# Patient Record
Sex: FEMALE | Race: WHITE | NOT HISPANIC OR LATINO | URBAN - METROPOLITAN AREA
[De-identification: names, ages, dates, MRNs, and addresses within clinical notes are randomized per-mention and may not be internally consistent; named-entity substitution may affect disease eponyms.]

---

## 2020-07-25 ENCOUNTER — TELEPHONE ENCOUNTER (OUTPATIENT)
Dept: URBAN - METROPOLITAN AREA CLINIC 13 | Facility: CLINIC | Age: 75
End: 2020-07-25

## 2020-07-25 RX ORDER — PANTOPRAZOLE SODIUM 40 MG/1
TAKE 1 TABLET DAILY TABLET, DELAYED RELEASE ORAL
Qty: 5 | Refills: 2 | OUTPATIENT
Start: 2018-07-24 | End: 2019-01-30

## 2020-07-25 RX ORDER — MULTIVITAMIN
TAKE 1 CAPSULE DAILY CAPSULE ORAL
Refills: 0 | OUTPATIENT
Start: 2014-10-20 | End: 2018-07-24

## 2020-07-25 RX ORDER — ESZOPICLONE 2 MG
TAKE 1 TABLET AT BEDTIME AS NEEDED TABLET ORAL
Refills: 0 | OUTPATIENT
Start: 2014-10-20 | End: 2018-07-24

## 2020-07-25 RX ORDER — RED YEAST RICE 600 MG
TAKE AS DIRECTED CAPSULE ORAL
Refills: 0 | OUTPATIENT
Start: 2014-10-20 | End: 2018-07-24

## 2020-07-25 RX ORDER — POLYETHYLENE GLYCOL 3350, SODIUM SULFATE, SODIUM CHLORIDE, POTASSIUM CHLORIDE, ASCORBIC ACID, SODIUM ASCORBATE 7.5-2.691G
DRINK 1 LITER OF SOLUTION AT 5:00 PM THE DAY BEFORE PROCEDURE; DRINK 1 LITER OF SOLUTION 6 HOURS PRIOR TO PROCEDURE KIT ORAL
Qty: 2000 | Refills: 0 | OUTPATIENT
Start: 2018-12-07 | End: 2018-12-12

## 2020-07-25 RX ORDER — NITROGLYCERIN 40 MG/1
APPLY PATCH FOR 12 TO 14 HOURS DAILY, THEN REMOVE PATCH TRANSDERMAL
Refills: 0 | OUTPATIENT
Start: 2014-10-20 | End: 2018-07-24

## 2020-07-25 RX ORDER — POLYETHYLENE GLYCOL 3350, SODIUM SULFATE, SODIUM CHLORIDE, POTASSIUM CHLORIDE, ASCORBIC ACID, SODIUM ASCORBATE 7.5-2.691G
USE AS DIRECTED KIT ORAL
Qty: 1 | Refills: 0 | OUTPATIENT
Start: 2014-10-20 | End: 2018-07-24

## 2020-07-25 RX ORDER — LIRAGLUTIDE 6 MG/ML
INJECT 1.8 MG SUBCUTANEOUSLY EVERY DAY INJECTION SUBCUTANEOUS
Refills: 0 | OUTPATIENT
Start: 2014-10-20 | End: 2018-12-07

## 2020-07-25 RX ORDER — MULTIVITAMIN,THER AND MINERALS
TAKE 1 TABLET DAILY TABLET ORAL
Refills: 0 | OUTPATIENT
Start: 2005-12-09 | End: 2018-07-24

## 2020-07-25 RX ORDER — SODIUM SULFATE, POTASSIUM SULFATE, MAGNESIUM SULFATE 17.5; 3.13; 1.6 G/ML; G/ML; G/ML
5PM THE DAY BEFORE PROCEDURE, DRINK 1/2 OF PREP, THEN 6HOURS BEFORE PROCEDURE DRINK REMAINDER OF PREP SOLUTION, CONCENTRATE ORAL
Qty: 1 | Refills: 0 | OUTPATIENT
Start: 2018-12-10 | End: 2019-01-30

## 2020-07-25 RX ORDER — POLYETHYLENE GLYCOL 3350, SODIUM CHLORIDE, SODIUM BICARBONATE AND POTASSIUM CHLORIDE WITH LEMON FLAVOR 420; 11.2; 5.72; 1.48 G/4L; G/4L; G/4L; G/4L
TAKE 1/2 GALLON AT 5:00 PM DAY BEFORE PROCEDURE, TAKE SECOND 1/2 OF GALLON 6 HRS PRIOR TO PROCEDURE POWDER, FOR SOLUTION ORAL
Qty: 1 | Refills: 0 | OUTPATIENT
Start: 2018-12-21 | End: 2019-01-30

## 2020-07-26 ENCOUNTER — TELEPHONE ENCOUNTER (OUTPATIENT)
Dept: URBAN - METROPOLITAN AREA CLINIC 13 | Facility: CLINIC | Age: 75
End: 2020-07-26

## 2020-07-26 RX ORDER — CLINDAMYCIN HYDROCHLORIDE 300 MG/1
TAKE 4 CAPSULES BY MOUTH 1 HOUR PRIOR TO DENTAL APPOINTMENT CAPSULE ORAL
Qty: 4 | Refills: 0 | Status: ACTIVE | COMMUNITY
Start: 2018-03-26

## 2020-07-26 RX ORDER — CLINDAMYCIN HYDROCHLORIDE 300 MG/1
CAPSULE ORAL
Qty: 21 | Refills: 0 | Status: ACTIVE | COMMUNITY
Start: 2014-11-28

## 2020-07-26 RX ORDER — CYCLOBENZAPRINE HYDROCHLORIDE 10 MG/1
TAKE 1 TABLET BY MOUTH THREE TIMES A DAY IF NEEDED TABLET, FILM COATED ORAL
Qty: 90 | Refills: 0 | Status: ACTIVE | COMMUNITY
Start: 2018-01-29

## 2020-07-26 RX ORDER — AZITHROMYCIN DIHYDRATE 250 MG/1
TABLET, FILM COATED ORAL
Qty: 12 | Refills: 0 | Status: ACTIVE | COMMUNITY
Start: 2014-05-01

## 2020-07-26 RX ORDER — MULTIVITAMIN
TAKE 1 CAPSULE DAILY CAPSULE ORAL
Refills: 0 | Status: ACTIVE | COMMUNITY

## 2020-07-26 RX ORDER — OMEPRAZOLE 40 MG/1
CAPSULE, DELAYED RELEASE ORAL
Qty: 30 | Refills: 0 | Status: ACTIVE | COMMUNITY
Start: 2018-07-05

## 2020-07-26 RX ORDER — MUPIROCIN 20 MG/G
OINTMENT TOPICAL
Qty: 22 | Refills: 0 | Status: ACTIVE | COMMUNITY
Start: 2014-11-28

## 2020-07-26 RX ORDER — ALPRAZOLAM 0.5 MG/1
TABLET ORAL
Qty: 2 | Refills: 0 | Status: ACTIVE | COMMUNITY
Start: 2018-01-05

## 2020-07-26 RX ORDER — OMEGA-3-ACID ETHYL ESTERS 1 G/1
CAPSULE, LIQUID FILLED ORAL
Qty: 255 | Refills: 0 | Status: ACTIVE | COMMUNITY
Start: 2013-12-03

## 2020-07-26 RX ORDER — IBUPROFEN 800 MG
USE AS DIRECTED. PT STATES UNKNOWN DOSAGE TABLET ORAL
Refills: 0 | Status: ACTIVE | COMMUNITY

## 2020-07-26 RX ORDER — ALPRAZOLAM 0.5 MG/1
TABLET ORAL
Qty: 2 | Refills: 0 | Status: ACTIVE | COMMUNITY
Start: 2019-09-30

## 2020-07-26 RX ORDER — OMEGA-3-ACID ETHYL ESTERS 1 G/1
CAPSULE, LIQUID FILLED ORAL
Qty: 255 | Refills: 0 | Status: ACTIVE | COMMUNITY
Start: 2013-12-04

## 2020-07-26 RX ORDER — EXENATIDE 2 MG/.65ML
INJECTION, SUSPENSION, EXTENDED RELEASE SUBCUTANEOUS
Qty: 4 | Refills: 0 | Status: ACTIVE | COMMUNITY
Start: 2015-03-17

## 2020-07-26 RX ORDER — EXENATIDE 2 MG/.65ML
INJECTION, SUSPENSION, EXTENDED RELEASE SUBCUTANEOUS
Qty: 12 | Refills: 0 | Status: ACTIVE | COMMUNITY
Start: 2018-09-04

## 2020-07-26 RX ORDER — DEXAMETHASONE 1 MG
TABLET ORAL
Qty: 1 | Refills: 0 | Status: ACTIVE | COMMUNITY
Start: 2019-04-30

## 2020-07-26 RX ORDER — IBUPROFEN 800 MG/1
TAKE 1 TABLET BY MOUTH THREE TIMES A DAY TABLET ORAL
Qty: 30 | Refills: 0 | Status: ACTIVE | COMMUNITY
Start: 2019-11-06

## 2020-07-26 RX ORDER — ONDANSETRON 8 MG/1
TABLET ORAL
Qty: 30 | Refills: 0 | Status: ACTIVE | COMMUNITY
Start: 2018-07-05

## 2020-07-26 RX ORDER — MELOXICAM 7.5 MG/1
TABLET ORAL
Qty: 90 | Refills: 0 | Status: ACTIVE | COMMUNITY
Start: 2019-07-24

## 2020-07-26 RX ORDER — FOLIC ACID 0.8 MG
TAKE AS DIRECTED TABLET ORAL
Refills: 0 | Status: ACTIVE | COMMUNITY

## 2020-07-26 RX ORDER — TEMAZEPAM 30 MG/1
CAPSULE ORAL
Qty: 90 | Refills: 0 | Status: ACTIVE | COMMUNITY
Start: 2019-09-25

## 2020-07-26 RX ORDER — SITAGLIPTIN 50 MG/1
TABLET, FILM COATED ORAL
Qty: 30 | Refills: 0 | Status: ACTIVE | COMMUNITY
Start: 2019-05-22

## 2020-07-26 RX ORDER — CLINDAMYCIN HYDROCHLORIDE 300 MG/1
CAPSULE ORAL
Qty: 6 | Refills: 0 | Status: ACTIVE | COMMUNITY
Start: 2018-11-16

## 2020-07-26 RX ORDER — TEMAZEPAM 30 MG/1
CAPSULE ORAL
Qty: 30 | Refills: 0 | Status: ACTIVE | COMMUNITY
Start: 2018-12-21

## 2020-07-26 RX ORDER — MUPIROCIN 20 MG/G
OINTMENT TOPICAL
Qty: 22 | Refills: 0 | Status: ACTIVE | COMMUNITY
Start: 2018-02-20

## 2020-07-26 RX ORDER — GLIPIZIDE 10 MG/1
TAKE 1 TABLET TWICE DAILY TABLET, EXTENDED RELEASE ORAL
Refills: 0 | Status: ACTIVE | COMMUNITY

## 2020-07-26 RX ORDER — TEMAZEPAM 30 MG/1
CAPSULE ORAL
Qty: 30 | Refills: 0 | Status: ACTIVE | COMMUNITY
Start: 2019-01-11

## 2020-07-26 RX ORDER — NITROGLYCERIN 0.4 MG/1
TABLET SUBLINGUAL
Qty: 25 | Refills: 0 | Status: ACTIVE | COMMUNITY
Start: 2018-04-10

## 2020-07-26 RX ORDER — TEMAZEPAM 15 MG/1
TAKE 2 CAPSULE BY MOUTH AT BEDTIME FOR SLEEP CAPSULE ORAL
Qty: 12 | Refills: 0 | Status: ACTIVE | COMMUNITY
Start: 2019-02-08

## 2020-07-26 RX ORDER — DOXYCYCLINE HYCLATE 100 MG/1
TABLET ORAL
Qty: 28 | Refills: 0 | Status: ACTIVE | COMMUNITY
Start: 2014-08-31

## 2022-03-22 ENCOUNTER — OFFICE VISIT (OUTPATIENT)
Dept: URBAN - METROPOLITAN AREA CLINIC 72 | Facility: CLINIC | Age: 77
End: 2022-03-22
Payer: MEDICARE

## 2022-03-22 VITALS
SYSTOLIC BLOOD PRESSURE: 143 MMHG | HEART RATE: 70 BPM | BODY MASS INDEX: 29.8 KG/M2 | WEIGHT: 151.8 LBS | TEMPERATURE: 98.1 F | DIASTOLIC BLOOD PRESSURE: 79 MMHG | RESPIRATION RATE: 16 BRPM | HEIGHT: 60 IN

## 2022-03-22 DIAGNOSIS — K57.90 DIVERTICULOSIS: ICD-10-CM

## 2022-03-22 DIAGNOSIS — R10.11 RUQ ABDOMINAL PAIN: ICD-10-CM

## 2022-03-22 PROCEDURE — 99214 OFFICE O/P EST MOD 30 MIN: CPT | Performed by: INTERNAL MEDICINE

## 2022-03-22 RX ORDER — OXYBUTYNIN CHLORIDE 5 MG/1
1 TABLET TABLET, EXTENDED RELEASE ORAL ONCE A DAY
Status: ACTIVE | COMMUNITY

## 2022-03-22 RX ORDER — IBUPROFEN 800 MG/1
TAKE 1 TABLET BY MOUTH THREE TIMES A DAY TABLET ORAL
Qty: 30 | Refills: 0 | Status: DISCONTINUED | COMMUNITY
Start: 2019-11-06

## 2022-03-22 RX ORDER — DEXAMETHASONE 1 MG
TABLET ORAL
Qty: 1 | Refills: 0 | Status: DISCONTINUED | COMMUNITY
Start: 2019-04-30

## 2022-03-22 RX ORDER — EMPAGLIFLOZIN 10 MG/1
1 TABLET TABLET, FILM COATED ORAL ONCE A DAY
Status: ACTIVE | COMMUNITY

## 2022-03-22 RX ORDER — CLINDAMYCIN HYDROCHLORIDE 300 MG/1
CAPSULE ORAL
Qty: 21 | Refills: 0 | Status: DISCONTINUED | COMMUNITY
Start: 2014-11-28

## 2022-03-22 RX ORDER — MULTIVITAMIN
TAKE 2 CAPSULE DAILY CAPSULE ORAL ONCE DAILY
Refills: 0 | Status: ACTIVE | COMMUNITY

## 2022-03-22 RX ORDER — OMEGA-3-ACID ETHYL ESTERS 1 G/1
CAPSULE, LIQUID FILLED ORAL
Qty: 255 | Refills: 0 | Status: DISCONTINUED | COMMUNITY
Start: 2013-12-03

## 2022-03-22 RX ORDER — GLIPIZIDE 10 MG/1
TAKE 1 TABLET TWICE DAILY TABLET, EXTENDED RELEASE ORAL
Refills: 0 | Status: DISCONTINUED | COMMUNITY

## 2022-03-22 RX ORDER — ALPRAZOLAM 0.5 MG/1
TABLET ORAL
Qty: 2 | Refills: 0 | Status: DISCONTINUED | COMMUNITY
Start: 2018-01-05

## 2022-03-22 RX ORDER — ONDANSETRON 8 MG/1
TABLET ORAL
Qty: 30 | Refills: 0 | Status: DISCONTINUED | COMMUNITY
Start: 2018-07-05

## 2022-03-22 RX ORDER — THYROID, PORCINE 30 MG/1
1 TABLET ON AN EMPTY STOMACH TABLET ORAL ONCE A DAY
Status: ACTIVE | COMMUNITY

## 2022-03-22 RX ORDER — OMEGA-3-ACID ETHYL ESTERS 1 G/1
CAPSULE, LIQUID FILLED ORAL
Qty: 255 | Refills: 0 | Status: DISCONTINUED | COMMUNITY
Start: 2013-12-04

## 2022-03-22 RX ORDER — AZITHROMYCIN DIHYDRATE 250 MG/1
TABLET, FILM COATED ORAL
Qty: 12 | Refills: 0 | Status: DISCONTINUED | COMMUNITY
Start: 2014-05-01

## 2022-03-22 RX ORDER — SITAGLIPTIN 50 MG/1
1 TABLET TABLET, FILM COATED ORAL ONCE A DAY
Refills: 0 | Status: ACTIVE | COMMUNITY
Start: 2019-05-22

## 2022-03-22 RX ORDER — ASPIRIN 81 MG/1
1 TABLET TABLET, COATED ORAL
Status: ACTIVE | COMMUNITY

## 2022-03-22 RX ORDER — MUPIROCIN 20 MG/G
OINTMENT TOPICAL
Qty: 22 | Refills: 0 | Status: DISCONTINUED | COMMUNITY
Start: 2014-11-28

## 2022-03-22 RX ORDER — FOLIC ACID 0.8 MG
2 CAPSULES TABLET ORAL ONCE A DAY
Refills: 0 | Status: ACTIVE | COMMUNITY

## 2022-03-22 RX ORDER — EVOLOCUMAB 140 MG/ML
1 ML INJECTION, SOLUTION SUBCUTANEOUS
Status: ACTIVE | COMMUNITY

## 2022-03-22 RX ORDER — DOXYCYCLINE HYCLATE 100 MG/1
TABLET ORAL
Qty: 28 | Refills: 0 | Status: DISCONTINUED | COMMUNITY
Start: 2014-08-31

## 2022-03-22 RX ORDER — TEMAZEPAM 15 MG/1
1 CAPSULE AT BEDTIME AS NEEDED CAPSULE ORAL ONCE A DAY
Refills: 0 | Status: ACTIVE | COMMUNITY
Start: 2019-02-08

## 2022-03-22 RX ORDER — NITROGLYCERIN 0.4 MG/1
TABLET SUBLINGUAL
Qty: 25 | Refills: 0 | Status: DISCONTINUED | COMMUNITY
Start: 2018-04-10

## 2022-03-22 RX ORDER — CYCLOBENZAPRINE HYDROCHLORIDE 10 MG/1
TAKE 1 TABLET BY MOUTH THREE TIMES A DAY IF NEEDED TABLET, FILM COATED ORAL
Qty: 90 | Refills: 0 | Status: DISCONTINUED | COMMUNITY
Start: 2018-01-29

## 2022-03-22 RX ORDER — EXENATIDE 2 MG/.65ML
INJECTION, SUSPENSION, EXTENDED RELEASE SUBCUTANEOUS
Qty: 4 | Refills: 0 | Status: DISCONTINUED | COMMUNITY
Start: 2015-03-17

## 2022-03-22 RX ORDER — MELOXICAM 7.5 MG/1
TABLET ORAL
Qty: 90 | Refills: 0 | Status: DISCONTINUED | COMMUNITY
Start: 2019-07-24

## 2022-03-22 RX ORDER — TEMAZEPAM 30 MG/1
CAPSULE ORAL
Qty: 30 | Refills: 0 | Status: DISCONTINUED | COMMUNITY
Start: 2018-12-21

## 2022-03-22 RX ORDER — OMEPRAZOLE 40 MG/1
CAPSULE, DELAYED RELEASE ORAL
Qty: 30 | Refills: 0 | Status: DISCONTINUED | COMMUNITY
Start: 2018-07-05

## 2022-03-22 RX ORDER — IBUPROFEN 800 MG
USE AS DIRECTED. PT STATES UNKNOWN DOSAGE TABLET ORAL
Refills: 0 | Status: DISCONTINUED | COMMUNITY

## 2022-03-22 NOTE — HPI-TODAY'S VISIT:
Ms. Davis returns for follow-up, she is a 76-year-old female last seen in our office on 1/9/2020.  She has history of coronary artery disease with stents, sleep apnea on CPAP type 2 diabetes, diverticulosis with diverticular colitis status post hemicolectomy many years ago.  She had a colonoscopy that had hyperplastic polyps.  This was done in 2020.  Diverticular disease was also noted. She now returns with a complaint today of stomach pain.  She reports that she has right-sided abdominal pain, has been going on for 6 months off and on be getting worse.  It happens after she eats often, it now radiates across her abdomen and around to her back.  She denies any change of her bowel habits with this.  She was afraid that she may have chest pain associated with this, she went to her cardiologist, work-up there was negative.  She does have an implantable loop recorder. She denies any weight loss.  No change in bowel habits, no nausea or vomiting.  She is concerned that the squeezing pressure that she experiences in the right upper quadrant.  She does still have her gallbladder.

## 2022-04-07 ENCOUNTER — LAB OUTSIDE AN ENCOUNTER (OUTPATIENT)
Dept: URBAN - METROPOLITAN AREA CLINIC 72 | Facility: CLINIC | Age: 77
End: 2022-04-07

## 2022-04-07 ENCOUNTER — OFFICE VISIT (OUTPATIENT)
Dept: URBAN - METROPOLITAN AREA CLINIC 72 | Facility: CLINIC | Age: 77
End: 2022-04-07
Payer: MEDICARE

## 2022-04-07 ENCOUNTER — WEB ENCOUNTER (OUTPATIENT)
Dept: URBAN - METROPOLITAN AREA CLINIC 72 | Facility: CLINIC | Age: 77
End: 2022-04-07

## 2022-04-07 VITALS
SYSTOLIC BLOOD PRESSURE: 127 MMHG | WEIGHT: 152.8 LBS | DIASTOLIC BLOOD PRESSURE: 74 MMHG | HEART RATE: 74 BPM | BODY MASS INDEX: 30 KG/M2 | HEIGHT: 60 IN | RESPIRATION RATE: 16 BRPM | TEMPERATURE: 98.1 F

## 2022-04-07 DIAGNOSIS — K57.90 DIVERTICULOSIS: ICD-10-CM

## 2022-04-07 DIAGNOSIS — R10.11 RUQ ABDOMINAL PAIN: ICD-10-CM

## 2022-04-07 PROCEDURE — 99214 OFFICE O/P EST MOD 30 MIN: CPT | Performed by: INTERNAL MEDICINE

## 2022-04-07 RX ORDER — MULTIVITAMIN
TAKE 2 CAPSULE DAILY CAPSULE ORAL ONCE DAILY
Refills: 0 | Status: ACTIVE | COMMUNITY

## 2022-04-07 RX ORDER — THYROID, PORCINE 30 MG/1
1 TABLET ON AN EMPTY STOMACH TABLET ORAL ONCE A DAY
Status: ACTIVE | COMMUNITY

## 2022-04-07 RX ORDER — OXYBUTYNIN CHLORIDE 10 MG/1
1 TABLET TABLET, EXTENDED RELEASE ORAL ONCE A DAY
Status: ACTIVE | COMMUNITY

## 2022-04-07 RX ORDER — EMPAGLIFLOZIN 10 MG/1
1 TABLET TABLET, FILM COATED ORAL ONCE A DAY
Status: ACTIVE | COMMUNITY

## 2022-04-07 RX ORDER — ASPIRIN 81 MG/1
1 TABLET TABLET, COATED ORAL
Status: ACTIVE | COMMUNITY

## 2022-04-07 RX ORDER — EVOLOCUMAB 140 MG/ML
1 ML INJECTION, SOLUTION SUBCUTANEOUS
Status: DISCONTINUED | COMMUNITY

## 2022-04-07 RX ORDER — TEMAZEPAM 30 MG/1
1 CAPSULE AT BEDTIME AS NEEDED CAPSULE ORAL ONCE A DAY
Refills: 0 | Status: ACTIVE | COMMUNITY
Start: 2019-02-08

## 2022-04-07 RX ORDER — EVOLOCUMAB 420 MG/3.5
420MG KIT SUBCUTANEOUS
Status: ACTIVE | COMMUNITY

## 2022-04-07 RX ORDER — SITAGLIPTIN 50 MG/1
1 TABLET TABLET, FILM COATED ORAL ONCE A DAY
Refills: 0 | Status: ACTIVE | COMMUNITY
Start: 2019-05-22

## 2022-04-07 RX ORDER — THIAMINE HCL 100 MG
1 CAPSULE WITH A MEAL TABLET ORAL TWICE DAILY
Refills: 0 | Status: ACTIVE | COMMUNITY

## 2022-04-07 NOTE — HPI-TODAY'S VISIT:
Mrs. Davis returns for follow-up, recall she is a 76-year-old female last seen in office on 3/22/2022.  Past medical history significant for coronary artery disease with stent placement, sleep apnea on CPAP, type 2 diabetes, diverticulosis diverticular colitis status post hemicolectomy many years ago, hyperplastic polyps on colonoscopy 2020 and abdominal pain.  Also recently had work-up with cardiology has an implantable loop recorder.  When she was seen 3/22 she has right-sided abdominal pain for greater than 6 months no weight loss, no change in bowel habits no other alarm symptoms.  We decided to proceed with an ultrasound, this showed fatty infiltration of the liver otherwise unremarkable.  Still with post prandial  upper abdominal pain, intially it was ruq but now is more epigasdtric and generalized. Occurs almost immediatly after eating, associate with nausea no vomiting. Notes a history of an HH. Does have rare intermittent dysphagia. No change in BMs.

## 2022-04-29 ENCOUNTER — OFFICE VISIT (OUTPATIENT)
Dept: URBAN - METROPOLITAN AREA MEDICAL CENTER 40 | Facility: MEDICAL CENTER | Age: 77
End: 2022-04-29

## 2022-05-10 ENCOUNTER — OFFICE VISIT (OUTPATIENT)
Dept: URBAN - METROPOLITAN AREA CLINIC 72 | Facility: CLINIC | Age: 77
End: 2022-05-10

## 2022-06-30 ENCOUNTER — TELEPHONE ENCOUNTER (OUTPATIENT)
Dept: URBAN - METROPOLITAN AREA CLINIC 72 | Facility: CLINIC | Age: 77
End: 2022-06-30

## 2022-08-04 ENCOUNTER — OFFICE VISIT (OUTPATIENT)
Dept: URBAN - METROPOLITAN AREA MEDICAL CENTER 40 | Facility: MEDICAL CENTER | Age: 77
End: 2022-08-04
Payer: MEDICARE

## 2022-08-04 DIAGNOSIS — R10.11 RUQ ABDOMINAL PAIN: ICD-10-CM

## 2022-08-04 DIAGNOSIS — K29.80 DUODENITIS: ICD-10-CM

## 2022-08-04 DIAGNOSIS — K31.89 ACQUIRED DEFORMITY OF DUODENUM: ICD-10-CM

## 2022-08-04 DIAGNOSIS — K44.9 DIAPHRAGMATIC HERNIA: ICD-10-CM

## 2022-08-04 DIAGNOSIS — K22.89 DILATATION OF ESOPHAGUS: ICD-10-CM

## 2022-08-04 PROCEDURE — 43239 EGD BIOPSY SINGLE/MULTIPLE: CPT | Performed by: INTERNAL MEDICINE

## 2022-08-15 PROBLEM — 72007001 DUODENITIS: Status: ACTIVE | Noted: 2022-08-15

## 2022-08-18 ENCOUNTER — TELEPHONE ENCOUNTER (OUTPATIENT)
Dept: URBAN - METROPOLITAN AREA CLINIC 113 | Facility: CLINIC | Age: 77
End: 2022-08-18

## 2022-09-09 ENCOUNTER — OFFICE VISIT (OUTPATIENT)
Dept: URBAN - METROPOLITAN AREA CLINIC 72 | Facility: CLINIC | Age: 77
End: 2022-09-09
Payer: MEDICARE

## 2022-09-09 ENCOUNTER — DASHBOARD ENCOUNTERS (OUTPATIENT)
Age: 77
End: 2022-09-09

## 2022-09-09 VITALS
HEIGHT: 60 IN | BODY MASS INDEX: 28.86 KG/M2 | DIASTOLIC BLOOD PRESSURE: 76 MMHG | SYSTOLIC BLOOD PRESSURE: 123 MMHG | TEMPERATURE: 75 F | WEIGHT: 147 LBS

## 2022-09-09 DIAGNOSIS — K57.90 DIVERTICULOSIS: ICD-10-CM

## 2022-09-09 DIAGNOSIS — R10.84 GENERALIZED ABDOMINAL PAIN: ICD-10-CM

## 2022-09-09 DIAGNOSIS — R10.11 RUQ ABDOMINAL PAIN: ICD-10-CM

## 2022-09-09 PROBLEM — 397881000: Status: ACTIVE | Noted: 2022-03-22

## 2022-09-09 PROBLEM — 301717006 RIGHT UPPER QUADRANT PAIN: Status: ACTIVE | Noted: 2022-06-30

## 2022-09-09 PROCEDURE — 99214 OFFICE O/P EST MOD 30 MIN: CPT | Performed by: INTERNAL MEDICINE

## 2022-09-09 RX ORDER — EVOLOCUMAB 420 MG/3.5
420MG KIT SUBCUTANEOUS
Status: ACTIVE | COMMUNITY

## 2022-09-09 RX ORDER — SITAGLIPTIN 50 MG/1
1 TABLET TABLET, FILM COATED ORAL ONCE A DAY
Refills: 0 | Status: ACTIVE | COMMUNITY
Start: 2019-05-22

## 2022-09-09 RX ORDER — TEMAZEPAM 30 MG/1
1 CAPSULE AT BEDTIME AS NEEDED CAPSULE ORAL ONCE A DAY
Refills: 0 | Status: ACTIVE | COMMUNITY
Start: 2019-02-08

## 2022-09-09 RX ORDER — THYROID, PORCINE 30 MG/1
1 TABLET ON AN EMPTY STOMACH TABLET ORAL ONCE A DAY
Status: ACTIVE | COMMUNITY

## 2022-09-09 RX ORDER — OXYBUTYNIN CHLORIDE 10 MG/1
1 TABLET TABLET, EXTENDED RELEASE ORAL ONCE A DAY
Status: ACTIVE | COMMUNITY

## 2022-09-09 RX ORDER — EMPAGLIFLOZIN 10 MG/1
1 TABLET TABLET, FILM COATED ORAL ONCE A DAY
Status: ACTIVE | COMMUNITY

## 2022-09-09 RX ORDER — ASPIRIN 81 MG/1
1 TABLET TABLET, COATED ORAL
Status: ACTIVE | COMMUNITY

## 2022-09-09 RX ORDER — MULTIVITAMIN
TAKE 2 CAPSULE DAILY CAPSULE ORAL ONCE DAILY
Refills: 0 | Status: ACTIVE | COMMUNITY

## 2022-09-09 RX ORDER — THIAMINE HCL 100 MG
1 CAPSULE WITH A MEAL TABLET ORAL TWICE DAILY
Refills: 0 | Status: ACTIVE | COMMUNITY

## 2022-09-09 NOTE — HPI-TODAY'S VISIT:
Mrs. Davis returns for follow-up, she is a 77-year-old female last seen in our office on 4/7/2022.  She has history of coronary artery disease with stent placement, sleep apnea on CPAP, type 2 diabetes, diverticulosis status post hemicolectomy many years ago, as well as generalized and postprandial abdominal pain.  She had an ultrasound performed we saw her in March that showed some fatty infiltration of the liver.  We manage this conservatively but due to ongoing symptoms decided to proceed with upper endoscopy, this was done on 8/4/2022 significant for 2 cm hiatal hernia sliding in nature as well as mildly severe gastritis.  There is also a D3 well-circumscribed mass suggestive of lipoma however the scope could not be advanced to that area for further biopsy and evaluation.  Biopsies from the upper endoscopy did show peptic duodenitis, stomach and esophageal biopsies were unremarkable.  We arranged for a small bowel follow-through for her nausea and vomiting after eating that she had a small hiatal hernia and moderate to severe esophageal dysmotility.  No obvious mucosal lesions appreciated on that exam.  Past she is having significant abdominal pain she is distended today she has nausea daily history of twice, has lost weight.  She has had history of adhesions in the past.  She is not a great surgical candidate.  She reports that she feels worse nearly every single day.  She has not had any cross-sectional imaging, she is allergic to CT contrast dye.  Her last labs were from April.  They were unremarkable and within normal limits.

## 2022-09-13 LAB
A/G RATIO: 2.1
ALBUMIN: 4.2
ALKALINE PHOSPHATASE: 56
ALT (SGPT): 27
AST (SGOT): 28
BILIRUBIN, TOTAL: 0.5
BUN/CREATININE RATIO: 22
BUN: 13
CALCIUM: 9.1
CARBON DIOXIDE, TOTAL: 23
CHLORIDE: 98
CREATININE: 0.58
EGFR: 94
GLOBULIN, TOTAL: 2
GLUCOSE: 144
POTASSIUM: 4.1
PROTEIN, TOTAL: 6.2
SODIUM: 137

## 2022-09-16 ENCOUNTER — TELEPHONE ENCOUNTER (OUTPATIENT)
Dept: URBAN - METROPOLITAN AREA CLINIC 72 | Facility: CLINIC | Age: 77
End: 2022-09-16

## 2022-12-21 ENCOUNTER — OFFICE VISIT (OUTPATIENT)
Dept: URBAN - METROPOLITAN AREA CLINIC 72 | Facility: CLINIC | Age: 77
End: 2022-12-21

## 2024-11-21 ENCOUNTER — TELEPHONE ENCOUNTER (OUTPATIENT)
Dept: URBAN - METROPOLITAN AREA CLINIC 113 | Facility: CLINIC | Age: 79
End: 2024-11-21

## 2024-11-27 ENCOUNTER — TELEPHONE ENCOUNTER (OUTPATIENT)
Dept: URBAN - METROPOLITAN AREA CLINIC 72 | Facility: CLINIC | Age: 79
End: 2024-11-27

## 2024-12-03 ENCOUNTER — OFFICE VISIT (OUTPATIENT)
Dept: URBAN - METROPOLITAN AREA CLINIC 72 | Facility: CLINIC | Age: 79
End: 2024-12-03
Payer: MEDICARE

## 2024-12-03 VITALS
TEMPERATURE: 97.6 F | SYSTOLIC BLOOD PRESSURE: 122 MMHG | DIASTOLIC BLOOD PRESSURE: 64 MMHG | WEIGHT: 148.6 LBS | HEIGHT: 60 IN | HEART RATE: 73 BPM | BODY MASS INDEX: 29.17 KG/M2

## 2024-12-03 DIAGNOSIS — R19.4 RECENT CHANGE IN FREQUENCY OF BOWEL MOVEMENTS: ICD-10-CM

## 2024-12-03 PROCEDURE — 99213 OFFICE O/P EST LOW 20 MIN: CPT | Performed by: INTERNAL MEDICINE

## 2024-12-03 RX ORDER — EVOLOCUMAB 420 MG/3.5
420MG KIT SUBCUTANEOUS
Status: ACTIVE | COMMUNITY

## 2024-12-03 RX ORDER — MULTIVITAMIN
TAKE 2 CAPSULE DAILY CAPSULE ORAL ONCE DAILY
Refills: 0 | Status: ACTIVE | COMMUNITY

## 2024-12-03 RX ORDER — THYROID, PORCINE 30 MG/1
1 TABLET ON AN EMPTY STOMACH TABLET ORAL ONCE A DAY
Status: ACTIVE | COMMUNITY

## 2024-12-03 RX ORDER — THIAMINE HCL 100 MG
1 CAPSULE WITH A MEAL TABLET ORAL TWICE DAILY
Refills: 0 | Status: ACTIVE | COMMUNITY

## 2024-12-03 RX ORDER — TEMAZEPAM 30 MG/1
1 CAPSULE AT BEDTIME AS NEEDED CAPSULE ORAL ONCE A DAY
Refills: 0 | Status: ACTIVE | COMMUNITY
Start: 2019-02-08

## 2024-12-03 RX ORDER — OXYBUTYNIN CHLORIDE 10 MG/1
1 TABLET TABLET, EXTENDED RELEASE ORAL ONCE A DAY
Status: ACTIVE | COMMUNITY

## 2024-12-03 RX ORDER — ASPIRIN 81 MG/1
1 TABLET TABLET, COATED ORAL
Status: ACTIVE | COMMUNITY

## 2024-12-03 RX ORDER — SITAGLIPTIN 50 MG/1
1 TABLET TABLET, FILM COATED ORAL ONCE A DAY
Refills: 0 | Status: ON HOLD | COMMUNITY
Start: 2019-05-22

## 2024-12-03 RX ORDER — EMPAGLIFLOZIN 10 MG/1
1 TABLET TABLET, FILM COATED ORAL ONCE A DAY
Status: ON HOLD | COMMUNITY

## 2024-12-03 NOTE — HPI-TODAY'S VISIT:
Mrs. Davis returns for follow-up.  Recall she is a 79-year-old last seen in office on 9/9/2022.  She has a history of coronary artery disease, sleep apnea, type 2 diabetes, diverticulosis, hemicolectomy and abdominal pain.  EGD done in 2022 for abdominal pain showed 2 cm hiatal hernia and gastritis, there is a well-circumscribed mass suggestive of lipoma in D3, this could not be reached with the endoscope and a small bowel follow-through was ordered that was unremarkable.  We last saw her she is having issues with abdominal pain.  She is allergic to contrast.  She reached out to us then and November stating that she has been having larger stools with associated discomfort.  Her stools had been larger in girth here more difficult to pass, she has started taking Colace tablets daily and has increased her fiber intake with lots of vegetables and this has helped.  She is feeling better now.  She is going to the bathroom daily versus previously every 3 days.

## 2024-12-03 NOTE — EXAM-PHYSICAL EXAM
General--no acute distress, resting comfortably Eyes--anicteric, no pallor HENT--normocephalic, atraumatic head Neck--no lymphadenopathy, symmetric Chest--non labored breathing, equal rise Abdomen--soft, non tender, non distended, no organomegaly Ext: YULIYA, no obvious sores or rashes

## 2024-12-17 ENCOUNTER — OFFICE VISIT (OUTPATIENT)
Dept: URBAN - METROPOLITAN AREA CLINIC 72 | Facility: CLINIC | Age: 79
End: 2024-12-17